# Patient Record
Sex: MALE | Race: OTHER | NOT HISPANIC OR LATINO | URBAN - METROPOLITAN AREA
[De-identification: names, ages, dates, MRNs, and addresses within clinical notes are randomized per-mention and may not be internally consistent; named-entity substitution may affect disease eponyms.]

---

## 2018-01-10 ENCOUNTER — EMERGENCY (EMERGENCY)
Facility: HOSPITAL | Age: 53
LOS: 1 days | Discharge: HOME | End: 2018-01-10
Attending: EMERGENCY MEDICINE
Payer: SELF-PAY

## 2018-01-10 VITALS
TEMPERATURE: 98 F | RESPIRATION RATE: 16 BRPM | HEART RATE: 98 BPM | SYSTOLIC BLOOD PRESSURE: 145 MMHG | DIASTOLIC BLOOD PRESSURE: 91 MMHG | HEIGHT: 69 IN | WEIGHT: 210.1 LBS | OXYGEN SATURATION: 98 %

## 2018-01-10 PROCEDURE — 73060 X-RAY EXAM OF HUMERUS: CPT

## 2018-01-10 PROCEDURE — 73080 X-RAY EXAM OF ELBOW: CPT | Mod: 26,LT

## 2018-01-10 PROCEDURE — 73060 X-RAY EXAM OF HUMERUS: CPT | Mod: 26,LT

## 2018-01-10 PROCEDURE — 73080 X-RAY EXAM OF ELBOW: CPT

## 2018-01-10 PROCEDURE — 99284 EMERGENCY DEPT VISIT MOD MDM: CPT | Mod: 25

## 2018-01-10 PROCEDURE — 99285 EMERGENCY DEPT VISIT HI MDM: CPT

## 2018-01-10 PROCEDURE — 29105 APPLICATION LONG ARM SPLINT: CPT | Mod: LT

## 2018-01-10 RX ORDER — IBUPROFEN 200 MG
600 TABLET ORAL ONCE
Qty: 0 | Refills: 0 | Status: COMPLETED | OUTPATIENT
Start: 2018-01-10 | End: 2018-01-10

## 2018-01-10 RX ADMIN — Medication 600 MILLIGRAM(S): at 12:19

## 2018-01-10 NOTE — ED PROVIDER NOTE - OBJECTIVE STATEMENT
51 y/o M pt w/ no significant PMHx presents to the ED c/o L elbow pain s/p fall yesterday. Pt reports that he feels a piece of bone floating around. Denies numbness/tingling, weakness or any other complaints. NKDA.

## 2018-01-10 NOTE — CONSULT NOTE ADULT - SUBJECTIVE AND OBJECTIVE BOX
Pt Name: OLEGARIO BRICE  MRN: [id number]    ORTHOPEDIC CONSULT:    Orthopedic diagnosis: Lt Olecranon avulsion fracture    52yMaleHPI:  Ortho called to evaluate Male s/p slip and fall on ice yesterday onto his Left elbow, where the patient sustained a Lt Olecranon avulsion fracture.  This morning. patient woke up with a swollen, painful Left elbow where he described the pain as "sharp" with touch and with ROM. Pain increases with touch and with ROM. Pain subsides with rest. Non-radiating pain. Pt denies Chest pain, SOB, dyspnea, paresthesias, N/V/D, abdominal pain, syncope, or pain anywhere else.       AMBULATION: Baseline Ambulation [X ] independent [ ] With Cane [ ] With Walker [ ]  Bedbound [ ] Pivot transfers to Wheelchair only    PAST MEDICAL & SURGICAL HISTORY:  No pertinent past medical history      ALLERGIES: No Known Allergies      MEDICATIONS:     PHYSICAL EXAM:    Vital Signs Last 24 Hrs  T(C): 36.8 (10 Arron 2018 11:11), Max: 36.8 (10 Arron 2018 11:11)  T(F): 98.3 (10 Arron 2018 11:11), Max: 98.3 (10 Arron 2018 11:11)  HR: 98 (10 Arron 2018 11:11) (98 - 98)  BP: 145/91 (10 Arron 2018 11:11) (145/91 - 145/91)  BP(mean): --  RR: 16 (10 Arron 2018 11:11) (16 - 16)  SpO2: 98% (10 Arron 2018 11:11) (98% - 98%)    Gen: well developed, well nourished, comfortable  Rectal: deferred  Extremities: no clubbing/cyanosis, no edema, no calf tenderness  Vascular:  DP/PT 2+ b/l  Neurological: no focal deficits  Skin: no rash on visible skin  Musculoskeletal:    Left elbow: Skin is pink, warm, localized swelling over the left elbow. No erythema. Closed fracture. Tenderness over the olecranon. Pain with resistance. Radial/ulnar/median nerves intact. 2+pulses. NVI compartments soft.        RADIOLOGY:   < from: Xray Elbow AP + Lateral + Oblique, Left (01.10.18 @ 11:54) >    EXAM:  ELBOW LEFT (3 VIEWS)                            PROCEDURE DATE:  01/10/2018          INTERPRETATION:  X-rays of the left humerus and left elbow    Indication: Trauma.    3 views of the left elbow and 2 views of the left humerus are acquired   without a previous examination for comparison.    Impression: No evidence for an acute fracture or dislocation.    Apparent old enthesophyte fracture fragment posterior to the left elbow.    The elbow joint spaces are preserved.    Osteoarthritis atthe left shoulder.    The osseous mineralization is within normal limits.                ANDREWS VALLECILLO M.D., ATTENDING RADIOLOGIST  This document has been electronically signed. Arron 10 2018 12:38PM                < end of copied text >      IMPRESSION: Pt is a  52y Male with Left olecranon avulsion fx of osteophyte    PLAN:  -  Pain control with tylenol and Motrin prn pain  -  Posterior splint applied to the Left elbow x 1 week with sling  -  orthopedically stable for discharge.   -  Recommendation: Patient was advised that surgical intervention was not needed. That a full recovery looks promising. However, patient will follow up with orthosurgeon on call to possibly remove the avulsion fragment because he feels that it may be uncomfortable.  -  Case d/w   - Follow up with Dr Acuña at [phone]

## 2018-01-10 NOTE — ED ADULT NURSE NOTE - OBJECTIVE STATEMENT
pt from home c/o of Lt elbow pain s/p slip and fall yesterday pt is alert awake unable to extend Lt elbow skin dry and intact small amt of swelling to Lt elbow

## 2018-01-10 NOTE — ED PROCEDURE NOTE - CPROC ED POST PROC CARE GUIDE1
Instructed patient/caregiver to follow-up with primary care physician./Verbal/written post procedure instructions were given to patient/caregiver./Elevate the injured extremity as instructed./Keep the cast/splint/dressing clean and dry./Instructed patient/caregiver regarding signs and symptoms of infection.

## 2019-05-22 NOTE — ED ADULT NURSE NOTE - CAS EDN DISCHARGE ASSESSMENT
Detail Level: Detailed Quality 130: Documentation Of Current Medications In The Medical Record: Current Medications Documented Quality 131: Pain Assessment And Follow-Up: Pain assessment using a standardized tool is documented as negative, no follow-up plan required Quality 265: Biopsy Follow-Up: Biopsy results reviewed, communicated, tracked, and documented Alert and oriented to person, place and time

## 2020-06-15 NOTE — ED ADULT NURSE NOTE - PAIN RATING/NUMBER SCALE (0-10): ACTIVITY
SLEEVE performed by Ryanne Byrd MD at [place] [facility]       Past Social History:  Social History     Socioeconomic History    Marital status:      Spouse name: Not on file    Number of children: Not on file    Years of education: Not on file    Highest education level: Not on file   Occupational History    Not on file   Social Needs    Financial resource strain: Not on file    Food insecurity     Worry: Not on file     Inability: Not on file    Transportation needs     Medical: Not on file     Non-medical: Not on file   Tobacco Use    Smoking status: Never Smoker    Smokeless tobacco: Never Used   Substance and Sexual Activity    Alcohol use: Yes     Comment: very rare    Drug use: No    Sexual activity: Not on file   Lifestyle    Physical activity     Days per week: Not on file     Minutes per session: Not on file    Stress: Not on file   Relationships    Social connections     Talks on phone: Not on file     Gets together: Not on file     Attends Rastafari service: Not on file     Active member of club or organization: Not on file     Attends meetings of clubs or organizations: Not on file     Relationship status: Not on file    Intimate partner violence     Fear of current or ex partner: Not on file     Emotionally abused: Not on file     Physically abused: Not on file     Forced sexual activity: Not on file   Other Topics Concern    Not on file   Social History Narrative    Not on file        Medications:   Current Outpatient Medications   Medication Sig Dispense Refill    tamsulosin (FLOMAX) 0.4 MG capsule Take 1 capsule by mouth daily for 5 days 5 capsule 0    oxyCODONE-acetaminophen (PERCOCET) 5-325 MG per tablet Take 1 tablet by mouth every 4 hours as needed for Pain for up to 5 days. Intended supply: 3 days.  Take lowest dose possible to manage pain 20 tablet 0    ketorolac (TORADOL) 10 MG tablet Take 1 tablet by mouth every 6 hours as needed for Pain 20 tablet 0    ondansetron
8

## 2024-10-09 NOTE — ED ADULT NURSE NOTE - CHPI ED SYMPTOMS NEG
Dr. Hodan Domínguez -[facility]  [address]   [phone]      Dr. Toro Huber - [facility]  [phone]    Dr. Kavitha Gilbert - [facility]  Dr. Gentile  [address], [place] [address]  Phone: [phone]    no deformity/no bruising